# Patient Record
Sex: MALE | URBAN - METROPOLITAN AREA
[De-identification: names, ages, dates, MRNs, and addresses within clinical notes are randomized per-mention and may not be internally consistent; named-entity substitution may affect disease eponyms.]

---

## 2018-02-21 DIAGNOSIS — M25.559 HIP JOINT PAIN: Primary | ICD-10-CM

## 2018-02-21 LAB
BASOPHILS # BLD AUTO: 0.1 10E9/L (ref 0–0.2)
BASOPHILS NFR BLD AUTO: 0.8 %
CRP SERPL-MCNC: <2.9 MG/L (ref 0–8)
DIFFERENTIAL METHOD BLD: NORMAL
EOSINOPHIL # BLD AUTO: 0.2 10E9/L (ref 0–0.7)
EOSINOPHIL NFR BLD AUTO: 3.4 %
ERYTHROCYTE [DISTWIDTH] IN BLOOD BY AUTOMATED COUNT: 14.2 % (ref 10–15)
ERYTHROCYTE [SEDIMENTATION RATE] IN BLOOD BY WESTERGREN METHOD: 9 MM/H (ref 0–15)
HCT VFR BLD AUTO: 47 % (ref 40–53)
HGB BLD-MCNC: 15.8 G/DL (ref 13.3–17.7)
LYMPHOCYTES # BLD AUTO: 1.7 10E9/L (ref 0.8–5.3)
LYMPHOCYTES NFR BLD AUTO: 23.3 %
MCH RBC QN AUTO: 27.3 PG (ref 26.5–33)
MCHC RBC AUTO-ENTMCNC: 33.6 G/DL (ref 31.5–36.5)
MCV RBC AUTO: 81 FL (ref 78–100)
MONOCYTES # BLD AUTO: 0.8 10E9/L (ref 0–1.3)
MONOCYTES NFR BLD AUTO: 11.7 %
NEUTROPHILS # BLD AUTO: 4.3 10E9/L (ref 1.6–8.3)
NEUTROPHILS NFR BLD AUTO: 60.8 %
PLATELET # BLD AUTO: 273 10E9/L (ref 150–450)
RBC # BLD AUTO: 5.79 10E12/L (ref 4.4–5.9)
WBC # BLD AUTO: 7.1 10E9/L (ref 4–11)

## 2018-02-21 PROCEDURE — 85025 COMPLETE CBC W/AUTO DIFF WBC: CPT | Performed by: ORTHOPAEDIC SURGERY

## 2018-02-21 PROCEDURE — 86140 C-REACTIVE PROTEIN: CPT | Performed by: ORTHOPAEDIC SURGERY

## 2018-02-21 PROCEDURE — 36415 COLL VENOUS BLD VENIPUNCTURE: CPT | Performed by: ORTHOPAEDIC SURGERY

## 2018-02-21 PROCEDURE — 85652 RBC SED RATE AUTOMATED: CPT | Performed by: ORTHOPAEDIC SURGERY

## 2018-02-23 ENCOUNTER — HOSPITAL ENCOUNTER (OUTPATIENT)
Dept: NUCLEAR MEDICINE | Facility: CLINIC | Age: 33
Setting detail: NUCLEAR MEDICINE
End: 2018-02-23
Attending: ORTHOPAEDIC SURGERY
Payer: COMMERCIAL

## 2018-02-23 ENCOUNTER — HOSPITAL ENCOUNTER (OUTPATIENT)
Dept: NUCLEAR MEDICINE | Facility: CLINIC | Age: 33
Setting detail: NUCLEAR MEDICINE
Discharge: HOME | End: 2018-02-23
Attending: ORTHOPAEDIC SURGERY | Admitting: ORTHOPAEDIC SURGERY
Payer: COMMERCIAL

## 2018-02-23 DIAGNOSIS — Z96.649 H/O TOTAL HIP ARTHROPLASTY: ICD-10-CM

## 2018-02-23 DIAGNOSIS — M25.559 JOINT PAIN, HIP: ICD-10-CM

## 2018-02-23 PROCEDURE — A9561 TC99M OXIDRONATE: HCPCS | Performed by: ORTHOPAEDIC SURGERY

## 2018-02-23 PROCEDURE — 78315 BONE IMAGING 3 PHASE: CPT

## 2018-02-23 PROCEDURE — 34300033 ZZH RX 343: Performed by: ORTHOPAEDIC SURGERY

## 2018-02-23 RX ADMIN — Medication 25.1 MILLICURIE: at 08:00

## 2018-03-08 ENCOUNTER — TRANSFERRED RECORDS (OUTPATIENT)
Dept: HEALTH INFORMATION MANAGEMENT | Facility: CLINIC | Age: 33
End: 2018-03-08

## 2018-03-14 ENCOUNTER — HOSPITAL ENCOUNTER (OUTPATIENT)
Dept: CT IMAGING | Facility: CLINIC | Age: 33
Discharge: HOME | End: 2018-03-14
Attending: ORTHOPAEDIC SURGERY | Admitting: ORTHOPAEDIC SURGERY
Payer: COMMERCIAL

## 2018-03-14 DIAGNOSIS — M25.559 HIP JOINT PAIN: ICD-10-CM

## 2018-03-14 PROCEDURE — 73700 CT LOWER EXTREMITY W/O DYE: CPT | Mod: LT

## 2018-04-09 DIAGNOSIS — H53.10 SUBJECTIVE VISUAL DISTURBANCE: Primary | ICD-10-CM

## 2018-04-11 ENCOUNTER — TELEPHONE (OUTPATIENT)
Dept: OPHTHALMOLOGY | Facility: CLINIC | Age: 33
End: 2018-04-11

## 2018-04-11 ENCOUNTER — OFFICE VISIT (OUTPATIENT)
Dept: OPHTHALMOLOGY | Facility: CLINIC | Age: 33
End: 2018-04-11
Payer: COMMERCIAL

## 2018-04-11 DIAGNOSIS — H53.40 VISUAL FIELD DEFECT: ICD-10-CM

## 2018-04-11 DIAGNOSIS — H35.363 MACULAR DRUSEN, BILATERAL: Primary | ICD-10-CM

## 2018-04-11 DIAGNOSIS — H53.10 SUBJECTIVE VISUAL DISTURBANCE: ICD-10-CM

## 2018-04-11 RX ORDER — LISINOPRIL 10 MG/1
TABLET ORAL
Refills: 0 | COMMUNITY
Start: 2018-02-03

## 2018-04-11 RX ORDER — GABAPENTIN 300 MG/1
CAPSULE ORAL
Refills: 0 | COMMUNITY
Start: 2018-03-23

## 2018-04-11 ASSESSMENT — VISUAL ACUITY
OS_SC: 20/50
OD_SC: 20/50
METHOD_MR_RETINOSCOPY: 1
METHOD: SNELLEN - LINEAR

## 2018-04-11 ASSESSMENT — TONOMETRY
OD_IOP_MMHG: 9
IOP_METHOD: ICARE
OS_IOP_MMHG: 9

## 2018-04-11 ASSESSMENT — EXTERNAL EXAM - LEFT EYE: OS_EXAM: NORMAL

## 2018-04-11 ASSESSMENT — CUP TO DISC RATIO
OD_RATIO: 0.2
OS_RATIO: 0.2

## 2018-04-11 ASSESSMENT — REFRACTION_MANIFEST
OS_AXIS: 090
OD_CYLINDER: +0.50
OS_CYLINDER: +0.50
OS_SPHERE: -1.00
OD_SPHERE: -1.00
OD_AXIS: 180

## 2018-04-11 ASSESSMENT — CONF VISUAL FIELD
OS_NORMAL: 1
METHOD: COUNTING FINGERS
OD_NORMAL: 1

## 2018-04-11 ASSESSMENT — EXTERNAL EXAM - RIGHT EYE: OD_EXAM: NORMAL

## 2018-04-11 ASSESSMENT — SLIT LAMP EXAM - LIDS
COMMENTS: NORMAL
COMMENTS: NORMAL

## 2018-04-11 NOTE — LETTER
2018         RE:  :  MRN: Gustavo Cruz  1985  [id number]     Dear Dr. Tyson,    Thank you for asking me to see your very pleasant patient, Gustavo Cruz, in neuro-ophthalmic consultation.  I would like to thank you for sending your records and I have summarized them in the history of present illness.  My assessment and plan are below.  For further details, please see my attached clinic note.       Assessment & Plan   Gustavo Cruz is a 32 year old male with the following diagnoses:   1. Macular drusen, bilateral    2. Subjective visual disturbance    3. Visual field defect       About 2 years ago, he received steroid shots in his back and leg. About 2 weeks after the second shot in his leg, he noted blurring of his vision. There are times when it is normal.  Moving things are not normal.  TV, cars moving and people walking are abnormal.  When a car stops then it seems normal.  Closing an eye doesn't help.  At night, when lights are moving then he sees a trail behind it. No use of hallucinogenic drugs, topiramate, or trazodone.  Overall, vision change is not changing.  Eats a balanced diet.  He has some numbness in his left leg (this occurred after a hip replacement).  He denies double vision.      On exam his visual acuity is 20/50 in each eye.  He misses a couple of color plates in each eye.  His pupils are sluggish in both eyes.  His optic nerves appear normal.  He has drusen in the macula of both eyes.  His visual fields show constriction in both eyes.  The OCT shows normal nerve fiber layer and normal macula in both eyes.    It is my impression that he has reduced vision in both eyes.  It is unclear whether he has an abnormality of the retina or the optic nerve.  Both appeared normal on the OCT.  Refraction cannot improve his vision.  It may be that he has nonorganic vision loss.  I will obtain a corneal topography.  I will also obtain in the fundus  autofluorescence in a multifocal ERG.  He had an MRI in 2016 after he lost his vision and I will look at this.  If all these tests come back normal then I believe that the most likely diagnosis would be nonorganic vision loss.  I will see him again in 1-2 months.      Again, thank you for allowing me to participate in the care of your patient.      Sincerely,    Minesh Jiménez MD  Professor, Neuro-Ophthalmology  Department of Ophthalmology and Visual Neurosciences  [facility]

## 2018-04-11 NOTE — PROGRESS NOTES
Assessment & Plan     Gustavo Cruz is a 32 year old male with the following diagnoses:   1. Macular drusen, bilateral    2. Subjective visual disturbance    3. Visual field defect       About 2 years ago, he received steroid shots in his back and leg. About 2 weeks after the second shot in his leg, he noted blurring of his vision. There are times when it is normal.  Moving things are not normal.  TV, cars moving and people walking are abnormal.  When a car stops then it seems normal.  Closing an eye doesn't help.  At night, when lights are moving then he sees a trail behind it. No use of hallucinogenic drugs, topiramate, or trazodone.  Overall, vision change is not changing.  Eats a balanced diet.  He has some numbness in his left leg (this occurred after a hip replacement).  He denies double vision.        On exam his visual acuity is 20/50 in each eye.  He misses a couple of color plates in each eye.  His pupils are sluggish in both eyes.  His optic nerves appear normal.  He has drusen in the macula of both eyes.  His visual fields show constriction in both eyes.  The OCT shows normal nerve fiber layer and normal macula in both eyes.    It is my impression that he has reduced vision in both eyes.  It is unclear whether he has an abnormality of the retina or the optic nerve.  Both appeared normal on the OCT.  Refraction cannot improve his vision.  It may be that he has nonorganic vision loss.  I will obtain a corneal topography.  I will also obtain in the fundus autofluorescence in a multifocal ERG.  He had an MRI in 2016 after he lost his vision and I will look at this.  If all these tests come back normal then I believe that the most likely diagnosis would be nonorganic vision loss.  I will see him again in 1-2 months.           Attending Physician Attestation:  Complete documentation of historical and exam elements from today's encounter can be found in the full encounter summary report (not  reduplicated in this progress note).  I personally obtained the chief complaint(s) and history of present illness.  I confirmed and edited as necessary the review of systems, past medical/surgical history, family history, social history, and examination findings as documented by others; and I examined the patient myself.  I personally reviewed the relevant tests, images, and reports as documented above.  I formulated and edited as necessary the assessment and plan and discussed the findings and management plan with the patient and family. - Minesh Jiménez MD

## 2018-04-11 NOTE — NURSING NOTE
Chief Complaints and History of Present Illnesses   Patient presents with     Eye Problem     HPI    Affected eye(s):  Both   Symptoms:     Blurred vision      Frequency:  Constant       Do you have eye pain now?:  No      Comments:  Patient states vision get blurry quickly- more with strain. Pt states in may have started 2 years ago after getting steroid injection in the leg. No gls. No drops.    Gladys DU 7:20 AM April 11, 2018

## 2018-04-11 NOTE — MR AVS SNAPSHOT
After Visit Summary   2018    Gustavo Cruz    MRN: [id number]           Patient Information     Date Of Birth          1985        Visit Information        Provider Department      2018 7:15 AM Minesh Jiménez MD [facility] Ophthalmology        Today's Diagnoses     Macular drusen, bilateral    -  1    Subjective visual disturbance        Visual field defect           Follow-ups after your visit        Additional Services     ERG Referral                 Future tests that were ordered for you today     Open Future Orders        Priority Expected Expires Ordered    Fundus Autofluorescence Image (FAF) OU (both eyes) Routine  10/11/2019 2018    Corneal Topography OU (both eyes) Routine  10/11/2019 2018            Who to contact     Please call your clinic at [phone] to:    Ask questions about your health    Make or cancel appointments    Discuss your medicines    Learn about your test results    Speak to your doctor            Additional Information About Your Visit        MyChart Information     [employer] is an electronic gateway that provides easy, online access to your medical records. With [employer], you can request a clinic appointment, read your test results, renew a prescription or communicate with your care team.     To sign up for [employer] visit the website at [email or web address]   You will be asked to enter the access code listed below, as well as some personal information. Please follow the directions to create your username and password.     Your access code is: [email or web address]  Expires: 2018  6:31 AM     Your access code will  in 90 days. If you need help or a new code, please contact your [facility] or call [phone] for assistance.        Care EveryWhere ID     This is your Care EveryWhere ID. This could be used by other organizations to access your [place] medical records  [id number]          Blood Pressure from Last 3 Encounters:   07/07/16 130/84    Weight from Last 3 Encounters:   07/07/16 81.2 kg (179 lb)   04/08/16 76.2 kg (168 lb)              We Performed the Following     Color Vision - Screening OU (both eyes)     ERG Referral     Glaucoma Top OU     IOP Measurement     OCT Optic Nerve RNFL Spectralis OU (both eyes)        Primary Care Provider Fax #    Physician No Ref-Primary [phone]       No address on file        Equal Access to Services     CHANTE OG : Hadii aad ku hadasho Soomaali, waaxda luqadaha, qaybta kaalmada adeegyada, waxay idiin haypakon karla salvadoranjelgerson rosado . So [facility] [phone].    ATENCIÓN: Si gopi ramirez, tiene a maharaj disposición servicios gratuitos de asistencia lingüística. [facility] [phone].    We comply with applicable federal civil rights laws and [place] laws. We do not discriminate on the basis of race, color, national origin, age, disability, sex, sexual orientation, or gender identity.            Thank you!     Thank you for choosing [facility] OPHTHALMOLOGY  for your care. Our goal is always to provide you with excellent care. Hearing back from our patients is one way we can continue to improve our services. Please take a few minutes to complete the written survey that you may receive in the mail after your visit with us. Thank you!             Your Updated Medication List - Protect others around you: Learn how to safely use, store and throw away your medicines at www.disposemymeds.org.          This list is accurate as of 4/11/18  8:33 AM.  Always use your most recent med list.                   Brand Name Dispense Instructions for use Diagnosis    CYMBALTA PO      Take 90 mg by mouth daily        escitalopram 20 MG tablet    LEXAPRO          gabapentin 300 MG capsule    NEURONTIN          hydrOXYzine 25 MG tablet    ATARAX          lisinopril 10 MG tablet    PRINIVIL/ZESTRIL          lisinopril-hydrochlorothiazide 10-12.5 MG per tablet     PRINZIDE/ZESTORETIC          methocarbamol 500 MG tablet    ROBAXIN          * Q- MG tablet   Generic drug:  acetaminophen      TK 2 TS PO Q 4 H PRF PAIN OR FEVER        * PAIN & FEVER EXTRA STRENGTH 500 MG tablet   Generic drug:  acetaminophen           traMADol 50 MG tablet    ULTRAM     TK 1 T PO BID PRN P        warfarin 1 MG tablet    COUMADIN          * Notice:  This list has 2 medication(s) that are the same as other medications prescribed for you. Read the directions carefully, and ask your doctor or other care provider to review them with you.

## 2018-04-17 ENCOUNTER — MEDICAL CORRESPONDENCE (OUTPATIENT)
Dept: HEALTH INFORMATION MANAGEMENT | Facility: CLINIC | Age: 33
End: 2018-04-17

## 2018-04-24 ENCOUNTER — ALLIED HEALTH/NURSE VISIT (OUTPATIENT)
Dept: OPHTHALMOLOGY | Facility: CLINIC | Age: 33
End: 2018-04-24
Attending: OPHTHALMOLOGY
Payer: COMMERCIAL

## 2018-04-24 DIAGNOSIS — Z53.9 ERRONEOUS ENCOUNTER--DISREGARD: Primary | ICD-10-CM

## 2018-04-24 ASSESSMENT — VISUAL ACUITY
OS_SC: 20/20
OD_SC: 20/20
METHOD: SNELLEN - LINEAR
OD_SC+: -1
OS_SC+: -1

## 2018-04-24 NOTE — MR AVS SNAPSHOT
After Visit Summary   2018    Gustavo Cruz    MRN: [id number]           Patient Information     Date Of Birth          1985        Visit Information        Provider Department      2018 10:15 AM [facility]        Today's Diagnoses     ERRONEOUS ENCOUNTER--DISREGARD    -  1       Follow-ups after your visit        Who to contact     Please call your clinic at [phone] to:    Ask questions about your health    Make or cancel appointments    Discuss your medicines    Learn about your test results    Speak to your doctor            Additional Information About Your Visit        MyChart Information     [email or web address] is an electronic gateway that provides easy, online access to your medical records. With [email or web address], you can request a clinic appointment, read your test results, renew a prescription or communicate with your care team.     To sign up for [employer] visit the website at [email or web address]   You will be asked to enter the access code listed below, as well as some personal information. Please follow the directions to create your username and password.     Your access code is: [email or web address]  Expires: 2018  6:31 AM     Your access code will  in 90 days. If you need help or a new code, please contact your [facility] or call [phone] for assistance.        Care EveryWhere ID     This is your Care EveryWhere ID. This could be used by other organizations to access your [place] medical records  [id number]         Blood Pressure from Last 3 Encounters:   16 130/84    Weight from Last 3 Encounters:   16 81.2 kg (179 lb)   16 76.2 kg (168 lb)              Today, you had the following     No orders found for display       Primary Care Provider Fax #    Physician No Ref-Primary [phone]       No address on file        Equal Access to Services     CHANTE OG AH: Andi coles  Sotamirali, waaxda luqadaha, qaybta kaalmada mayela, arik burrelljohn santi. So [facility] [phone].    ATENCIÓN: Si gopi ramirez, tiene a maharaj disposición servicios gratuitos de asistencia lingüística. Nav[facility] al [phone].    We comply with applicable federal civil rights laws and [place] laws. We do not discriminate on the basis of race, color, national origin, age, disability, sex, sexual orientation, or gender identity.            Thank you!     Thank you for choosing EYE CLINIC  for your care. Our goal is always to provide you with excellent care. Hearing back from our patients is one way we can continue to improve our services. Please take a few minutes to complete the written survey that you may receive in the mail after your visit with us. Thank you!             Your Updated Medication List - Protect others around you: Learn how to safely use, store and throw away your medicines at www.disposemymeds.org.          This list is accurate as of 4/24/18 11:23 AM.  Always use your most recent med list.                   Brand Name Dispense Instructions for use Diagnosis    CYMBALTA PO      Take 90 mg by mouth daily        escitalopram 20 MG tablet    LEXAPRO          gabapentin 300 MG capsule    NEURONTIN          hydrOXYzine 25 MG tablet    ATARAX          lisinopril 10 MG tablet    PRINIVIL/ZESTRIL          lisinopril-hydrochlorothiazide 10-12.5 MG per tablet    PRINZIDE/ZESTORETIC          methocarbamol 500 MG tablet    ROBAXIN          * Q- MG tablet   Generic drug:  acetaminophen      TK 2 TS PO Q 4 H PRF PAIN OR FEVER        * PAIN & FEVER EXTRA STRENGTH 500 MG tablet   Generic drug:  acetaminophen           traMADol 50 MG tablet    ULTRAM     TK 1 T PO BID PRN P        warfarin 1 MG tablet    COUMADIN          * Notice:  This list has 2 medication(s) that are the same as other medications prescribed for you. Read the directions carefully, and ask your doctor or other care  provider to review them with you.

## 2022-08-02 ENCOUNTER — OFFICE VISIT (OUTPATIENT)
Dept: URGENT CARE | Facility: URGENT CARE | Age: 37
End: 2022-08-02
Payer: COMMERCIAL

## 2022-08-02 VITALS
TEMPERATURE: 97.9 F | HEART RATE: 85 BPM | DIASTOLIC BLOOD PRESSURE: 84 MMHG | SYSTOLIC BLOOD PRESSURE: 124 MMHG | OXYGEN SATURATION: 97 % | WEIGHT: 146.4 LBS | BODY MASS INDEX: 25.13 KG/M2

## 2022-08-02 DIAGNOSIS — R10.13 EPIGASTRIC PAIN: Primary | ICD-10-CM

## 2022-08-02 RX ORDER — MELOXICAM 7.5 MG/1
TABLET ORAL
COMMUNITY
Start: 2022-08-02

## 2022-08-02 RX ORDER — TIZANIDINE 2 MG/1
TABLET ORAL
COMMUNITY
Start: 2022-08-02

## 2022-08-02 NOTE — PROGRESS NOTES
***        No Known Allergies    Past Medical History:   Diagnosis Date     Depression      Hypertension        medical cannabis (Patient's own supply), See Admin Instructions (The purpose of this order is to document that the patient reports taking medical cannabis.  This is not a prescription, and is not used to certify that the patient has a qualifying medical condition.)  meloxicam (MOBIC) 7.5 MG tablet,   tiZANidine (ZANAFLEX) 2 MG tablet,   DULoxetine HCl (CYMBALTA PO), Take 90 mg by mouth daily  (Patient not taking: Reported on 8/2/2022)  escitalopram (LEXAPRO) 20 MG tablet,   gabapentin (NEURONTIN) 300 MG capsule,   hydrOXYzine (ATARAX) 25 MG tablet,   lisinopril (PRINIVIL/ZESTRIL) 10 MG tablet,   lisinopril-hydrochlorothiazide (PRINZIDE,ZESTORETIC) 10-12.5 MG per tablet,   methocarbamol (ROBAXIN) 500 MG tablet,   PAIN & FEVER EXTRA STRENGTH 500 MG tablet,   Q- MG tablet, TK 2 TS PO Q 4 H PRF PAIN OR FEVER (Patient not taking: Reported on 8/2/2022)  traMADol (ULTRAM) 50 MG tablet, TK 1 T PO BID PRN P (Patient not taking: Reported on 8/2/2022)  warfarin (COUMADIN) 1 MG tablet,     No current facility-administered medications on file prior to visit.      Social History     Tobacco Use     Smoking status: Former Smoker     Smokeless tobacco: Never Used   Substance Use Topics     Alcohol use: Yes     Drug use: Yes     Comment: cocaine, heroin       ROS:  General: negative for fever  Resp: negative for chest pain   CV: negative for chest pain  ABD: as above  : negative for dysuria  Neurologic:negative for Headache    OBJECTIVE:  /84 (BP Location: Left arm, Patient Position: Sitting, Cuff Size: Adult Regular)   Pulse 85   Temp 97.9  F (36.6  C) (Tympanic)   Wt 66.4 kg (146 lb 6.4 oz)   SpO2 97%   BMI 25.13 kg/m     General:   awake, alert, and cooperative.  NAD.   Head: Normocephalic, atraumatic.  Eyes: Conjunctiva clear, non icteric.   Heart: Regular rate and rhythm. No murmur.  Lungs:  Chest is clear; no wheezes or rales.  ABD: soft, no tenderness to palpation , no rigidity, guarding or rebound , bowel sounds intact  Neuro: Alert and oriented - normal speech.     ASSESSMENT:      PLAN:        Advised about symptoms which might herald more serious problems.        Katiana Brown PA-C

## 2023-04-24 ENCOUNTER — TRANSFERRED RECORDS (OUTPATIENT)
Dept: HEALTH INFORMATION MANAGEMENT | Facility: CLINIC | Age: 38
End: 2023-04-24

## 2023-05-23 ENCOUNTER — TRANSCRIBE ORDERS (OUTPATIENT)
Dept: OTHER | Age: 38
End: 2023-05-23

## 2023-08-16 ENCOUNTER — TRANSFERRED RECORDS (OUTPATIENT)
Dept: HEALTH INFORMATION MANAGEMENT | Facility: CLINIC | Age: 38
End: 2023-08-16

## 2023-08-18 ENCOUNTER — MEDICAL CORRESPONDENCE (OUTPATIENT)
Dept: HEALTH INFORMATION MANAGEMENT | Facility: CLINIC | Age: 38
End: 2023-08-18

## 2023-10-10 ENCOUNTER — TRANSCRIBE ORDERS (OUTPATIENT)
Dept: OTHER | Age: 38
End: 2023-10-10

## 2023-10-10 DIAGNOSIS — M79.18 MYOFASCIAL PAIN: ICD-10-CM

## 2023-10-10 DIAGNOSIS — M54.81 OCCIPITAL NEURALGIA: ICD-10-CM

## 2023-10-10 DIAGNOSIS — R51.9 PERSISTENT HEADACHES: ICD-10-CM

## 2023-10-10 DIAGNOSIS — G93.89 ENCEPHALOMALACIA: ICD-10-CM

## 2023-10-10 DIAGNOSIS — M54.2 NECK PAIN: Primary | ICD-10-CM
